# Patient Record
Sex: FEMALE
[De-identification: names, ages, dates, MRNs, and addresses within clinical notes are randomized per-mention and may not be internally consistent; named-entity substitution may affect disease eponyms.]

---

## 2023-11-09 PROBLEM — Z00.00 ENCOUNTER FOR PREVENTIVE HEALTH EXAMINATION: Status: ACTIVE | Noted: 2023-11-09

## 2023-12-19 ENCOUNTER — APPOINTMENT (OUTPATIENT)
Dept: ORTHOPEDIC SURGERY | Facility: CLINIC | Age: 83
End: 2023-12-19
Payer: MEDICARE

## 2023-12-19 VITALS — HEIGHT: 64 IN | WEIGHT: 132 LBS | BODY MASS INDEX: 22.53 KG/M2

## 2023-12-19 DIAGNOSIS — G89.29 LOW BACK PAIN, UNSPECIFIED: ICD-10-CM

## 2023-12-19 DIAGNOSIS — M54.50 LOW BACK PAIN, UNSPECIFIED: ICD-10-CM

## 2023-12-19 PROCEDURE — 99203 OFFICE O/P NEW LOW 30 MIN: CPT

## 2023-12-19 RX ORDER — CELECOXIB 200 MG/1
200 CAPSULE ORAL
Qty: 30 | Refills: 0 | Status: ACTIVE | COMMUNITY
Start: 2023-12-19 | End: 1900-01-01

## 2023-12-19 NOTE — DISCUSSION/SUMMARY
[de-identified] : Patient I discussed at length the underlying etiology of her current symptoms.  I believe the patient has clinical exam and history all consistent with diagnosis of lumbar arthritis.  Patient is not exhibiting any radicular symptoms at this point or in her history.  I believe that she would be best served by utilizing a light weight lumbar support brace when ambulating outdoors I also believe she would benefit significantly from use of a Medrol Dosepak.  Reasonable risk benefits of medication discussed in detail including potential side effects.  We reviewed her entire medication list there appears to be no relative contraindication to its current limited use.  Patient will follow-up in 2 weeks for repeat evaluation possible CT scan/MRI she does not see sustained symptomatic improvement with the Medrol Dosepak.  Today's consultation lasted 35 minutes.

## 2023-12-19 NOTE — HISTORY OF PRESENT ILLNESS
[de-identified] : Patient is here first-time visit she is here with complaint of 3 to 4-week history of low back pain.  Patient does not recall a specific accident or initiating traumatic event she denies any change in bowel or bladder habits she states she has a fairly chronic problem with feeling unbalanced but no obvious weakness in the right or left lower extremity.  She has been sent to physical therapy for lower extremity strengthening by other physicians.  She is here to be evaluated for low back pain seems to be improving slowly it is intermittent sometimes very sharp.

## 2023-12-19 NOTE — PHYSICAL EXAM
[de-identified] : Patient has some tenderness palpation of the lumbar segments both right and left-sided.  The patient has full range of motion of both hips with no restriction mechanical block or pain when held at 90 degrees.  She is neurovascular intact distally.  Negative straight leg raising test.

## 2024-01-16 ENCOUNTER — APPOINTMENT (OUTPATIENT)
Dept: ORTHOPEDIC SURGERY | Facility: CLINIC | Age: 84
End: 2024-01-16

## 2024-01-17 ENCOUNTER — APPOINTMENT (OUTPATIENT)
Dept: INTERNAL MEDICINE | Facility: CLINIC | Age: 84
End: 2024-01-17
Payer: MEDICARE

## 2024-01-17 VITALS
HEART RATE: 69 BPM | TEMPERATURE: 98.3 F | RESPIRATION RATE: 14 BRPM | DIASTOLIC BLOOD PRESSURE: 73 MMHG | OXYGEN SATURATION: 95 % | HEIGHT: 64 IN | SYSTOLIC BLOOD PRESSURE: 148 MMHG | WEIGHT: 133 LBS | BODY MASS INDEX: 22.71 KG/M2

## 2024-01-17 DIAGNOSIS — E78.2 MIXED HYPERLIPIDEMIA: ICD-10-CM

## 2024-01-17 DIAGNOSIS — Z23 ENCOUNTER FOR IMMUNIZATION: ICD-10-CM

## 2024-01-17 DIAGNOSIS — I65.23 OCCLUSION AND STENOSIS OF BILATERAL CAROTID ARTERIES: ICD-10-CM

## 2024-01-17 DIAGNOSIS — I10 ESSENTIAL (PRIMARY) HYPERTENSION: ICD-10-CM

## 2024-01-17 DIAGNOSIS — E11.9 TYPE 2 DIABETES MELLITUS W/OUT COMPLICATIONS: ICD-10-CM

## 2024-01-17 PROCEDURE — 99214 OFFICE O/P EST MOD 30 MIN: CPT | Mod: 25

## 2024-01-17 PROCEDURE — 90677 PCV20 VACCINE IM: CPT

## 2024-01-17 PROCEDURE — 36415 COLL VENOUS BLD VENIPUNCTURE: CPT

## 2024-01-17 PROCEDURE — G0009: CPT | Mod: 59

## 2024-01-17 RX ORDER — ROSUVASTATIN CALCIUM 20 MG/1
20 TABLET, FILM COATED ORAL DAILY
Qty: 90 | Refills: 3 | Status: ACTIVE | COMMUNITY
Start: 2024-01-17 | End: 1900-01-01

## 2024-01-17 NOTE — HISTORY OF PRESENT ILLNESS
[FreeTextEntry1] :  Patient here for periodic assessment and blood work. [de-identified] :  Patient here for periodic assessment and blood work.

## 2024-01-23 ENCOUNTER — APPOINTMENT (OUTPATIENT)
Dept: ORTHOPEDIC SURGERY | Facility: CLINIC | Age: 84
End: 2024-01-23
Payer: MEDICARE

## 2024-01-23 PROCEDURE — 99214 OFFICE O/P EST MOD 30 MIN: CPT

## 2024-01-23 RX ORDER — METHYLPREDNISOLONE 4 MG/1
4 TABLET ORAL
Qty: 1 | Refills: 1 | Status: ACTIVE | COMMUNITY
Start: 2024-01-23 | End: 1900-01-01

## 2024-01-23 NOTE — REASON FOR VISIT
[Follow-Up Visit] : a follow-up visit for [Other: ____] : [unfilled] [FreeTextEntry2] : PATIENT FELL AND INJURED HER LEFT SHOULDER AND LOWER BACK. SHE IS ALSO COMPLAINING OD PAIN IN THE UPPER RIGHT ARM THAT FEELS LIKE CRAMPING

## 2024-01-23 NOTE — HISTORY OF PRESENT ILLNESS
[de-identified] : Patient returns today continue complain of low back pain.  She was diagnosed tentatively on previous visit lumbar radiculopathy.  Patient was given prescription for Celebrex but she did not take the medication after reviewing the potential side effects.  She continues to complain of low back pain with radiating pain to the affected right.  Patient was exhibiting left-sided low back pain on previous visit but now it is more right-sided.  Patient denies any change in bowel or bladder habits or any left or right lower extremity.

## 2024-01-23 NOTE — DISCUSSION/SUMMARY
[de-identified] : Patient I talked about again the underlying etiology of her discomfort.  Patient has clinical exam and history all consistent with diagnosis of lumbar radiculopathy/arthritis.  At this point we will place patient on a Medrol Dosepak we carefully reviewed the reasonable risk and benefits of the patient including potential complications side effects.  We reviewed the dosing regimen as well as her current medication profile appears to be normal to patient follow-up with us next Tuesday if not thoroughly improved an MRI of the lumbar spine may be warranted.  This consultation was 30 minutes.

## 2024-01-23 NOTE — PHYSICAL EXAM
[de-identified] : Patient has some tenderness palpation of the lumbar segments both right and left-sided, more pronounced on the right side..  The patient has full range of motion of both hips with no restriction mechanical block or pain when held at 90 degrees.  She is neurovascular intact distally.  Negative straight leg raising test.

## 2024-01-30 ENCOUNTER — RX RENEWAL (OUTPATIENT)
Age: 84
End: 2024-01-30

## 2024-01-30 ENCOUNTER — APPOINTMENT (OUTPATIENT)
Dept: ORTHOPEDIC SURGERY | Facility: CLINIC | Age: 84
End: 2024-01-30
Payer: MEDICARE

## 2024-01-30 DIAGNOSIS — M54.16 RADICULOPATHY, LUMBAR REGION: ICD-10-CM

## 2024-01-30 PROCEDURE — 99214 OFFICE O/P EST MOD 30 MIN: CPT

## 2024-01-30 RX ORDER — METHYLPREDNISOLONE 4 MG/1
4 TABLET ORAL
Qty: 1 | Refills: 1 | Status: ACTIVE | COMMUNITY
Start: 2024-01-30 | End: 1900-01-01

## 2024-01-30 RX ORDER — HYDRALAZINE HYDROCHLORIDE 50 MG/1
50 TABLET ORAL 3 TIMES DAILY
Qty: 270 | Refills: 3 | Status: ACTIVE | COMMUNITY
Start: 2023-11-09 | End: 1900-01-01

## 2024-01-30 NOTE — REASON FOR VISIT
[Follow-Up Visit] : a follow-up visit for [Radiculopathy] : radiculopathy [FreeTextEntry2] : RIGHT SIDE LUMBAR PAIN.

## 2024-01-30 NOTE — HISTORY OF PRESENT ILLNESS
[de-identified] : Patient returns today she states she feels approximately 75% improvement previously prescribed Medrol Dosepak.  Patient continues to have some right-sided lower back pain that radiates into the groin but states as mentioned above to be significantly improved.

## 2024-01-30 NOTE — DISCUSSION/SUMMARY
[de-identified] : Patient I discussed our options at this point.  The first option would be simple observation she seems to improved significantly and I believe that just given some more time her symptoms will improve even further.  There is also discussed the option of second Medrol Dosepak reasonable risk and benefits that were discussed and I told the patient is fairly reasonable and typical to try second round of the medication but if that did not give a sustained symptomatic improvement we would not be giving her third or fourth round.  Patient opted for the second round of the Medrol Dosepak we will prescribe it for her she will follow-up with me in 2 weeks time if not thoroughly improved if she continues to exhibit signs of lumbar radiculopathy an MRI of the lumbar spine may be warranted at that point.  Today's consultation lasted 30 minutes.

## 2024-01-30 NOTE — PHYSICAL EXAM
[de-identified] : Patient has some tenderness palpation of the lumbar segments both right and left-sided, more pronounced on the right side..  The patient has full range of motion of both hips with no restriction mechanical block or pain when held at 90 degrees.  She is neurovascular intact distally.  Negative straight leg raising test.

## 2024-02-13 ENCOUNTER — APPOINTMENT (OUTPATIENT)
Dept: ORTHOPEDIC SURGERY | Facility: CLINIC | Age: 84
End: 2024-02-13

## 2024-03-11 RX ORDER — EZETIMIBE 10 MG/1
10 TABLET ORAL DAILY
Qty: 90 | Refills: 3 | Status: ACTIVE | COMMUNITY
Start: 2024-03-11 | End: 1900-01-01

## 2024-03-11 RX ORDER — CARVEDILOL 25 MG/1
25 TABLET, FILM COATED ORAL
Qty: 180 | Refills: 3 | Status: ACTIVE | COMMUNITY
Start: 2023-11-09 | End: 1900-01-01

## 2024-04-15 RX ORDER — LOSARTAN POTASSIUM 50 MG/1
50 TABLET, FILM COATED ORAL DAILY
Qty: 90 | Refills: 3 | Status: DISCONTINUED | COMMUNITY
Start: 2024-03-11 | End: 2024-04-15

## 2024-04-17 ENCOUNTER — APPOINTMENT (OUTPATIENT)
Dept: INTERNAL MEDICINE | Facility: CLINIC | Age: 84
End: 2024-04-17

## 2024-04-17 RX ORDER — LOSARTAN POTASSIUM AND HYDROCHLOROTHIAZIDE 12.5; 5 MG/1; MG/1
50-12.5 TABLET ORAL DAILY
Qty: 90 | Refills: 3 | Status: ACTIVE | COMMUNITY
Start: 2024-04-15 | End: 1900-01-01

## 2024-04-17 RX ORDER — AMLODIPINE BESYLATE 5 MG/1
5 TABLET ORAL DAILY
Qty: 90 | Refills: 3 | Status: ACTIVE | COMMUNITY
Start: 2024-04-15 | End: 1900-01-01

## 2024-06-10 DIAGNOSIS — I49.8 OTHER SPECIFIED CARDIAC ARRHYTHMIAS: ICD-10-CM

## 2024-06-10 RX ORDER — MEXILETINE HYDROCHLORIDE 200 MG/1
200 CAPSULE ORAL TWICE DAILY
Qty: 180 | Refills: 1 | Status: ACTIVE | COMMUNITY
Start: 2024-06-10 | End: 1900-01-01

## 2024-07-30 ENCOUNTER — APPOINTMENT (OUTPATIENT)
Age: 84
End: 2024-07-30
Payer: MEDICARE

## 2024-07-30 VITALS
HEART RATE: 67 BPM | OXYGEN SATURATION: 98 % | BODY MASS INDEX: 22.36 KG/M2 | SYSTOLIC BLOOD PRESSURE: 148 MMHG | HEIGHT: 64 IN | DIASTOLIC BLOOD PRESSURE: 80 MMHG | RESPIRATION RATE: 14 BRPM | WEIGHT: 131 LBS | TEMPERATURE: 98.1 F

## 2024-07-30 DIAGNOSIS — I10 ESSENTIAL (PRIMARY) HYPERTENSION: ICD-10-CM

## 2024-07-30 DIAGNOSIS — E78.2 MIXED HYPERLIPIDEMIA: ICD-10-CM

## 2024-07-30 DIAGNOSIS — E11.9 TYPE 2 DIABETES MELLITUS W/OUT COMPLICATIONS: ICD-10-CM

## 2024-07-30 PROCEDURE — 99214 OFFICE O/P EST MOD 30 MIN: CPT

## 2024-07-30 PROCEDURE — 36415 COLL VENOUS BLD VENIPUNCTURE: CPT

## 2024-07-30 PROCEDURE — G2211 COMPLEX E/M VISIT ADD ON: CPT

## 2024-07-30 NOTE — ASSESSMENT
[FreeTextEntry1] :  refusing DEXA scan/ Mammogram  advised Neurology f/u  recent vascular surgery evaluated pts R ICA stenosis and recommended surveillance   refusing Vit b12 levels

## 2024-07-30 NOTE — HISTORY OF PRESENT ILLNESS
[FreeTextEntry1] :  Patient here for periodic assessment and blood work. [de-identified] :  Patient here for periodic assessment and blood work. also, c/o imbalance and memory loss.

## 2024-07-30 NOTE — HISTORY OF PRESENT ILLNESS
[FreeTextEntry1] :  Patient here for periodic assessment and blood work. [de-identified] :  Patient here for periodic assessment and blood work. also, c/o imbalance and memory loss.

## 2024-10-15 ENCOUNTER — RX RENEWAL (OUTPATIENT)
Age: 84
End: 2024-10-15

## 2024-10-17 ENCOUNTER — APPOINTMENT (OUTPATIENT)
Dept: ORTHOPEDIC SURGERY | Facility: CLINIC | Age: 84
End: 2024-10-17
Payer: MEDICARE

## 2024-10-17 DIAGNOSIS — M54.16 RADICULOPATHY, LUMBAR REGION: ICD-10-CM

## 2024-10-17 PROCEDURE — 99214 OFFICE O/P EST MOD 30 MIN: CPT

## 2024-10-17 RX ORDER — CELECOXIB 200 MG/1
200 CAPSULE ORAL
Qty: 24 | Refills: 1 | Status: ACTIVE | COMMUNITY
Start: 2024-10-17 | End: 1900-01-01

## 2024-10-17 RX ORDER — METHYLPREDNISOLONE 4 MG/1
4 TABLET ORAL
Qty: 1 | Refills: 1 | Status: ACTIVE | COMMUNITY
Start: 2024-10-17 | End: 1900-01-01

## 2024-10-17 NOTE — HISTORY OF PRESENT ILLNESS
[de-identified] : Established patient returns today she was seen most recently in January of this year at that point she had similar complaints with complaints today of right-sided lower lumbar pain.  She denies any change in bowel or bladder habits or any obvious weakness or numbness.  She did extremely well with at least 10 months of significant improvement of her symptoms with the previously prescribed Medrol Dosepak.  Patient is here for repeat evaluation.

## 2024-10-17 NOTE — REASON FOR VISIT
[Follow-Up Visit] : a follow-up visit for [Radiculopathy] : radiculopathy [FreeTextEntry2] : lumbar pain

## 2024-10-17 NOTE — DISCUSSION/SUMMARY
[de-identified] : Patient I discussed how her symptoms at current are similar to her complaints of January of this year.  She has a clinical exam history all suggestive of diagnosis of right-sided lumbar radiculopathy.  Utilizing a model we were able to review the part anatomy lumbar spine patient was again able to appreciate how exiting nerve root pressure due to his arthritis or bulging/disc herniations can cause symptoms very similar complaints.  Since patient did extremely well with sustained symptomatic improvement with previous Medrol Dosepak we will simply order this medicine for her again.  Will also give her prescription for Celebrex 200 mg p.o. to be taken once or twice a day beyond the Medrol Dosepak as needed.  The Celebrex will be more of a sustainable intermittently used anti-inflammatory if needed.  We reviewed the reasonable risk benefits of this medication as well including potential side effects and again there is no contraindication considering her current medications.  The patient sustained symptomatic improvement with the Medrol Dosepak and Celebrex use an MRI of the lumbar spine may be warranted with potential referral to pain management.  This consultation lasted 30 minutes.

## 2024-10-17 NOTE — PHYSICAL EXAM
[de-identified] : Patient has some tenderness palpation of the lumbar segments both right and left-sided, more pronounced on the right side..  The patient has full range of motion of both hips with no restriction mechanical block or pain when held at 90 degrees.  She is neurovascular intact distally.  Negative straight leg raising test.  Very similar to the exam of January of this year.

## 2024-10-30 ENCOUNTER — APPOINTMENT (OUTPATIENT)
Age: 84
End: 2024-10-30
Payer: MEDICARE

## 2024-10-30 ENCOUNTER — LABORATORY RESULT (OUTPATIENT)
Age: 84
End: 2024-10-30

## 2024-10-30 VITALS
RESPIRATION RATE: 14 BRPM | SYSTOLIC BLOOD PRESSURE: 148 MMHG | HEART RATE: 71 BPM | HEIGHT: 64 IN | OXYGEN SATURATION: 96 % | BODY MASS INDEX: 22.53 KG/M2 | DIASTOLIC BLOOD PRESSURE: 69 MMHG | WEIGHT: 132 LBS | TEMPERATURE: 98 F

## 2024-10-30 DIAGNOSIS — Z00.00 ENCOUNTER FOR GENERAL ADULT MEDICAL EXAMINATION W/OUT ABNORMAL FINDINGS: ICD-10-CM

## 2024-10-30 DIAGNOSIS — G47.00 INSOMNIA, UNSPECIFIED: ICD-10-CM

## 2024-10-30 DIAGNOSIS — Z23 ENCOUNTER FOR IMMUNIZATION: ICD-10-CM

## 2024-10-30 DIAGNOSIS — E78.2 MIXED HYPERLIPIDEMIA: ICD-10-CM

## 2024-10-30 DIAGNOSIS — I10 ESSENTIAL (PRIMARY) HYPERTENSION: ICD-10-CM

## 2024-10-30 DIAGNOSIS — R41.3 OTHER AMNESIA: ICD-10-CM

## 2024-10-30 DIAGNOSIS — J30.9 ALLERGIC RHINITIS, UNSPECIFIED: ICD-10-CM

## 2024-10-30 PROCEDURE — G0444 DEPRESSION SCREEN ANNUAL: CPT | Mod: 59

## 2024-10-30 PROCEDURE — 36415 COLL VENOUS BLD VENIPUNCTURE: CPT

## 2024-10-30 PROCEDURE — G0439: CPT

## 2024-10-30 RX ORDER — MEMANTINE HYDROCHLORIDE 5 MG/1
5 TABLET, FILM COATED ORAL DAILY
Qty: 90 | Refills: 3 | Status: ACTIVE | COMMUNITY
Start: 2024-10-30 | End: 1900-01-01

## 2024-10-30 RX ORDER — AZELASTINE HYDROCHLORIDE 137 UG/1
0.1 SPRAY, METERED NASAL TWICE DAILY
Qty: 3 | Refills: 1 | Status: ACTIVE | COMMUNITY
Start: 2024-10-30 | End: 1900-01-01

## 2024-10-30 RX ORDER — DOXEPIN 3 MG/1
3 TABLET, FILM COATED ORAL
Qty: 90 | Refills: 1 | Status: ACTIVE | COMMUNITY
Start: 2024-10-30 | End: 1900-01-01

## 2024-10-30 NOTE — HISTORY OF PRESENT ILLNESS
[FreeTextEntry1] :  patient here for yearly physical exam [de-identified] :  patient here for yearly physical exam

## 2024-10-30 NOTE — HEALTH RISK ASSESSMENT
[Very Good] : ~his/her~  mood as very good [Yes] : Yes [Never (0 pts)] : Never (0 points) [No] : In the past 12 months have you used drugs other than those required for medical reasons? No [No falls in past year] : Patient reported no falls in the past year [Little interest or pleasure doing things] : 1) Little interest or pleasure doing things [Feeling down, depressed, or hopeless] : 2) Feeling down, depressed, or hopeless [0] : 2) Feeling down, depressed, or hopeless: Not at all (0) [Time Spent: ___ Minutes] : I spent [unfilled] minutes performing a depression screening for this patient. [Never] : Never [NO] : No [HIV test declined] : HIV test declined [Hepatitis C test declined] : Hepatitis C test declined [None] : None [With Family] : lives with family [Retired] : retired [] :  [Feels Safe at Home] : Feels safe at home [Fully functional (bathing, dressing, toileting, transferring, walking, feeding)] : Fully functional (bathing, dressing, toileting, transferring, walking, feeding) [Fully functional (using the telephone, shopping, preparing meals, housekeeping, doing laundry, using] : Fully functional and needs no help or supervision to perform IADLs (using the telephone, shopping, preparing meals, housekeeping, doing laundry, using transportation, managing medications and managing finances) [Reports normal functional visual acuity (ie: able to read med bottle)] : Reports normal functional visual acuity [Change in mental status noted] : No change in mental status noted [Language] : denies difficulty with language [Behavior] : denies difficulty with behavior [Learning/Retaining New Information] : denies difficulty learning/retaining new information [Handling Complex Tasks] : denies difficulty handling complex tasks [Reasoning] : denies difficulty with reasoning [Spatial Ability and Orientation] : denies difficulty with spatial ability and orientation [Sexually Active] : not sexually active [High Risk Behavior] : no high risk behavior [Reports changes in hearing] : Reports no changes in hearing [Reports changes in vision] : Reports no changes in vision

## 2024-10-30 NOTE — REVIEW OF SYSTEMS
Duration Of Freeze Thaw-Cycle (Seconds): 30
Render Post-Care Instructions In Note?: yes
Post-Care Instructions: I reviewed with the patient in detail post-care instructions. Patient is to wear sunprotection, and avoid picking at any of the treated lesions. Pt may apply Vaseline to crusted or scabbing areas.
Detail Level: Detailed
Number Of Freeze-Thaw Cycles: 1 freeze-thaw cycle
Consent: The patient's consent was obtained including but not limited to risks of crusting, scabbing, blistering, scarring, darker or lighter pigmentary change, recurrence, incomplete removal and infection.
Duration Of Freeze Thaw-Cycle (Seconds): 30
[Negative] : Heme/Lymph

## 2024-10-31 ENCOUNTER — APPOINTMENT (OUTPATIENT)
Dept: ORTHOPEDIC SURGERY | Facility: CLINIC | Age: 84
End: 2024-10-31

## 2024-10-31 LAB
ALBUMIN SERPL ELPH-MCNC: 4.3 G/DL
ALP BLD-CCNC: 60 U/L
ALT SERPL-CCNC: 27 U/L
ANION GAP SERPL CALC-SCNC: 14 MMOL/L
APPEARANCE: CLEAR
AST SERPL-CCNC: 23 U/L
BASOPHILS # BLD AUTO: 0.04 K/UL
BASOPHILS NFR BLD AUTO: 0.4 %
BILIRUB SERPL-MCNC: 0.2 MG/DL
BILIRUBIN URINE: NEGATIVE
BLOOD URINE: NEGATIVE
BUN SERPL-MCNC: 15 MG/DL
CALCIUM SERPL-MCNC: 9.7 MG/DL
CHLORIDE SERPL-SCNC: 102 MMOL/L
CHOLEST SERPL-MCNC: 139 MG/DL
CO2 SERPL-SCNC: 25 MMOL/L
COLOR: NORMAL
CREAT SERPL-MCNC: 0.61 MG/DL
CREAT SPEC-SCNC: 60 MG/DL
EGFR: 89 ML/MIN/1.73M2
EOSINOPHIL # BLD AUTO: 0.16 K/UL
EOSINOPHIL NFR BLD AUTO: 1.6 %
ESTIMATED AVERAGE GLUCOSE: 157 MG/DL
GLUCOSE QUALITATIVE U: NEGATIVE MG/DL
GLUCOSE SERPL-MCNC: 109 MG/DL
HBA1C MFR BLD HPLC: 7.1 %
HCT VFR BLD CALC: 40.4 %
HDLC SERPL-MCNC: 54 MG/DL
HGB BLD-MCNC: 12.8 G/DL
IMM GRANULOCYTES NFR BLD AUTO: 0.2 %
KETONES URINE: NEGATIVE MG/DL
LDLC SERPL CALC-MCNC: 61 MG/DL
LEUKOCYTE ESTERASE URINE: NEGATIVE
LYMPHOCYTES # BLD AUTO: 1.75 K/UL
LYMPHOCYTES NFR BLD AUTO: 17.1 %
MAN DIFF?: NORMAL
MCHC RBC-ENTMCNC: 28.6 PG
MCHC RBC-ENTMCNC: 31.7 G/DL
MCV RBC AUTO: 90.4 FL
MICROALBUMIN 24H UR DL<=1MG/L-MCNC: 12.8 MG/DL
MICROALBUMIN/CREAT 24H UR-RTO: 213 MG/G
MONOCYTES # BLD AUTO: 0.78 K/UL
MONOCYTES NFR BLD AUTO: 7.6 %
NEUTROPHILS # BLD AUTO: 7.5 K/UL
NEUTROPHILS NFR BLD AUTO: 73.1 %
NITRITE URINE: NEGATIVE
NONHDLC SERPL-MCNC: 86 MG/DL
PH URINE: 6
PLATELET # BLD AUTO: 195 K/UL
POTASSIUM SERPL-SCNC: 4.2 MMOL/L
PROT SERPL-MCNC: 6.3 G/DL
PROTEIN URINE: 30 MG/DL
RBC # BLD: 4.47 M/UL
RBC # FLD: 15.6 %
SODIUM SERPL-SCNC: 141 MMOL/L
SPECIFIC GRAVITY URINE: 1.02
TRIGL SERPL-MCNC: 143 MG/DL
TSH SERPL-ACNC: 2.14 UIU/ML
UROBILINOGEN URINE: 0.2 MG/DL
WBC # FLD AUTO: 10.25 K/UL

## 2024-12-12 ENCOUNTER — APPOINTMENT (OUTPATIENT)
Age: 84
End: 2024-12-12
Payer: MEDICARE

## 2024-12-12 VITALS
OXYGEN SATURATION: 95 % | HEIGHT: 64 IN | BODY MASS INDEX: 22.71 KG/M2 | WEIGHT: 133 LBS | DIASTOLIC BLOOD PRESSURE: 71 MMHG | RESPIRATION RATE: 14 BRPM | SYSTOLIC BLOOD PRESSURE: 142 MMHG | HEART RATE: 76 BPM

## 2024-12-12 DIAGNOSIS — J06.9 ACUTE UPPER RESPIRATORY INFECTION, UNSPECIFIED: ICD-10-CM

## 2024-12-12 DIAGNOSIS — M25.562 PAIN IN RIGHT KNEE: ICD-10-CM

## 2024-12-12 DIAGNOSIS — M25.561 PAIN IN RIGHT KNEE: ICD-10-CM

## 2024-12-12 PROCEDURE — 99213 OFFICE O/P EST LOW 20 MIN: CPT

## 2024-12-13 NOTE — HISTORY OF PRESENT ILLNESS
[FreeTextEntry8] : here c/o nasal congestion, runny nose, dry coughing and sore throat x 3-4 days. denies fever

## 2024-12-17 LAB
RAPID RVP RESULT: DETECTED
RV+EV RNA NPH QL NAA+NON-PROBE: DETECTED
SARS-COV-2 RNA RESP QL NAA+PROBE: NOT DETECTED

## 2025-02-18 ENCOUNTER — RX RENEWAL (OUTPATIENT)
Age: 85
End: 2025-02-18

## 2025-08-28 ENCOUNTER — RX RENEWAL (OUTPATIENT)
Age: 85
End: 2025-08-28